# Patient Record
Sex: MALE | URBAN - METROPOLITAN AREA
[De-identification: names, ages, dates, MRNs, and addresses within clinical notes are randomized per-mention and may not be internally consistent; named-entity substitution may affect disease eponyms.]

---

## 2017-02-08 ENCOUNTER — TELEPHONE (OUTPATIENT)
Dept: FAMILY MEDICINE CLINIC | Facility: CLINIC | Age: 37
End: 2017-02-08

## 2017-02-08 DIAGNOSIS — M79.673 PAIN OF FOOT, UNSPECIFIED LATERALITY: Primary | ICD-10-CM

## 2017-02-08 NOTE — TELEPHONE ENCOUNTER
Pt called in requesting a referral to see Dr. Mindy Dumont for a follow up visit on his foot pain. Pt states he has a scheduled appt on 2/24.   Pt is asking the referral can be placed to him and if he can get a call back once referral is placed in the mail, zain

## 2017-02-24 ENCOUNTER — TELEPHONE (OUTPATIENT)
Dept: ADMINISTRATIVE | Age: 37
End: 2017-02-24

## 2017-02-24 ENCOUNTER — OFFICE VISIT (OUTPATIENT)
Dept: PODIATRY CLINIC | Facility: CLINIC | Age: 37
End: 2017-02-24

## 2017-02-24 DIAGNOSIS — M20.11 HALLUX VALGUS, RIGHT: Primary | ICD-10-CM

## 2017-02-24 PROCEDURE — 99213 OFFICE O/P EST LOW 20 MIN: CPT | Performed by: PODIATRIST

## 2017-02-24 PROCEDURE — 99212 OFFICE O/P EST SF 10 MIN: CPT | Performed by: PODIATRIST

## 2017-02-24 NOTE — TELEPHONE ENCOUNTER
That was not a discussion we had. He has a painful bunion, if he has that much pain we should be doing surgery.  scr

## 2017-02-24 NOTE — TELEPHONE ENCOUNTER
Good morning Dr. Phyllis Niño form pending in [facility]. Patient is requesting intermittent time off, 1-3 days per month, do you approve? Please advise.   Thanks  Reddy Delarosa

## 2017-02-24 NOTE — PROGRESS NOTES
HPI:    Patient ID: Sussy Shepherd is a 60 Wolf Stre[address] old male. HPI  29-year-old male presents with pain associated with the first metatarsophalangeal joint of the right foot.   The orthotic device is sitting correctly providing proper and adequate control right  (primary encounter diagnosis)    No orders of the defined types were placed in this encounter.        Meds This Visit:  No prescriptions requested or ordered in this encounter    Imaging & Referrals:  None       PI#[id number]

## 2017-03-09 ENCOUNTER — TELEPHONE (OUTPATIENT)
Dept: FAMILY MEDICINE CLINIC | Facility: CLINIC | Age: 37
End: 2017-03-09

## 2017-03-09 DIAGNOSIS — M25.519 SHOULDER PAIN, UNSPECIFIED CHRONICITY, UNSPECIFIED LATERALITY: Primary | ICD-10-CM

## 2017-11-09 ENCOUNTER — OFFICE VISIT (OUTPATIENT)
Dept: FAMILY MEDICINE CLINIC | Facility: CLINIC | Age: 37
End: 2017-11-09

## 2017-11-09 VITALS
DIASTOLIC BLOOD PRESSURE: 79 MMHG | BODY MASS INDEX: 26.83 KG/M2 | HEART RATE: 82 BPM | WEIGHT: 177 LBS | SYSTOLIC BLOOD PRESSURE: 141 MMHG | RESPIRATION RATE: 20 BRPM | HEIGHT: 68 IN

## 2017-11-09 DIAGNOSIS — Z00.00 ROUTINE PHYSICAL EXAMINATION: ICD-10-CM

## 2017-11-09 DIAGNOSIS — M54.9 DORSALGIA: ICD-10-CM

## 2017-11-09 PROCEDURE — 99395 PREV VISIT EST AGE 18-39: CPT | Performed by: FAMILY MEDICINE

## 2017-11-09 NOTE — PROGRESS NOTES
HPI:    Patient ID: Agustin Rodríguez is a 31year old male. Patient is here for routine physical exam. No acute issues. No significant chronic medical problems. Patient is requesting blood testing. Diet and exercise have been fair.  Past medical history alert. He has normal reflexes. Skin: No rash noted. Psychiatric: He has a normal mood and affect. His behavior is normal. Judgment and thought content normal.   Vitals reviewed.              ASSESSMENT/PLAN:   Routine physical examination:  - Exam is un

## 2017-11-11 ENCOUNTER — APPOINTMENT (OUTPATIENT)
Dept: LAB | Age: 37
End: 2017-11-11
Attending: FAMILY MEDICINE
Payer: COMMERCIAL

## 2017-11-11 DIAGNOSIS — Z00.00 ROUTINE PHYSICAL EXAMINATION: ICD-10-CM

## 2017-11-11 PROCEDURE — 85027 COMPLETE CBC AUTOMATED: CPT

## 2017-11-11 PROCEDURE — 80053 COMPREHEN METABOLIC PANEL: CPT

## 2017-11-11 PROCEDURE — 80061 LIPID PANEL: CPT

## 2017-11-11 PROCEDURE — 36415 COLL VENOUS BLD VENIPUNCTURE: CPT

## 2019-05-16 ENCOUNTER — OFFICE VISIT (OUTPATIENT)
Dept: FAMILY MEDICINE CLINIC | Facility: CLINIC | Age: 39
End: 2019-05-16
Payer: COMMERCIAL

## 2019-05-16 VITALS
SYSTOLIC BLOOD PRESSURE: 117 MMHG | BODY MASS INDEX: 27.14 KG/M2 | HEART RATE: 88 BPM | TEMPERATURE: 98 F | WEIGHT: 177 LBS | HEIGHT: 67.6 IN | DIASTOLIC BLOOD PRESSURE: 77 MMHG | RESPIRATION RATE: 20 BRPM

## 2019-05-16 DIAGNOSIS — Z83.71 FAMILY HISTORY OF FAMILIAL POLYPOSIS: ICD-10-CM

## 2019-05-16 DIAGNOSIS — Z00.00 ROUTINE PHYSICAL EXAMINATION: Primary | ICD-10-CM

## 2019-05-16 PROCEDURE — 99395 PREV VISIT EST AGE 18-39: CPT | Performed by: FAMILY MEDICINE

## 2019-05-16 NOTE — PROGRESS NOTES
HPI:    Patient ID: Lemont Siemens is a 41year old male. Patient is here for routine physical exam. No acute issues. No significant chronic medical problems. Patient is requesting blood testing. Diet and exercise have been good.  Past medical history Soft. Bowel sounds are normal. He exhibits no distension. There is no tenderness. There is no rebound and no guarding. Genitourinary: Rectum normal, prostate normal and penis normal.   Musculoskeletal: Normal range of motion.    Lymphadenopathy:     He ha

## 2019-05-23 ENCOUNTER — LAB ENCOUNTER (OUTPATIENT)
Dept: LAB | Age: 39
End: 2019-05-23
Attending: FAMILY MEDICINE
Payer: COMMERCIAL

## 2019-05-23 ENCOUNTER — OFFICE VISIT (OUTPATIENT)
Dept: SURGERY | Facility: CLINIC | Age: 39
End: 2019-05-23
Payer: COMMERCIAL

## 2019-05-23 VITALS
BODY MASS INDEX: 27 KG/M2 | WEIGHT: 178 LBS | DIASTOLIC BLOOD PRESSURE: 80 MMHG | HEART RATE: 90 BPM | TEMPERATURE: 98 F | SYSTOLIC BLOOD PRESSURE: 141 MMHG

## 2019-05-23 DIAGNOSIS — Z00.00 ROUTINE PHYSICAL EXAMINATION: ICD-10-CM

## 2019-05-23 DIAGNOSIS — R10.9 BILATERAL FLANK PAIN: Primary | ICD-10-CM

## 2019-05-23 PROCEDURE — 99212 OFFICE O/P EST SF 10 MIN: CPT | Performed by: NURSE PRACTITIONER

## 2019-05-23 PROCEDURE — 80061 LIPID PANEL: CPT

## 2019-05-23 PROCEDURE — 36415 COLL VENOUS BLD VENIPUNCTURE: CPT

## 2019-05-23 PROCEDURE — 80053 COMPREHEN METABOLIC PANEL: CPT

## 2019-05-23 PROCEDURE — 99243 OFF/OP CNSLTJ NEW/EST LOW 30: CPT | Performed by: NURSE PRACTITIONER

## 2019-05-23 PROCEDURE — 85027 COMPLETE CBC AUTOMATED: CPT

## 2019-05-23 NOTE — PROGRESS NOTES
HPI:    Patient ID: Sudhakar Goyal is a 41year old male.     HPI   Patient is a 41year old male who presents to the clinic for a consult at the request of, and a copy of this note will be sent to, Dr. Aye Bcak, regarding genital lesions and flank poli tablet Rfl: 5   Meloxicam 15 MG Oral Tab Take one tablet by mouth daily with dinner.  Disp: 30 tablet Rfl: 1     Allergies:No Known Allergies    HISTORY:  Past Medical History:   Diagnosis Date   • Acid reflux       Past Surgical History:   Procedure Jazmin Deem no abnormality then no further work up is indicated. Follow up as needed. No orders of the defined types were placed in this encounter.       Meds This Visit:  Requested Prescriptions      No prescriptions requested or ordered in this encounter

## 2019-08-03 ENCOUNTER — TELEPHONE (OUTPATIENT)
Dept: FAMILY MEDICINE CLINIC | Facility: CLINIC | Age: 39
End: 2019-08-03

## 2019-08-03 DIAGNOSIS — M54.9 DORSALGIA: Primary | ICD-10-CM

## 2019-08-03 NOTE — TELEPHONE ENCOUNTER
Pt is requesting a referral for a Chiropractor  for his back states that Dr. Wilma Hair gave him a referral before did inform the pt that Dr. Wilma Hair might want him to schedule a appt    Please advise

## 2019-08-05 NOTE — TELEPHONE ENCOUNTER
Referral request noted.  Referral approved, signed and sent to [facility] for Dr Jose R León

## 2019-08-08 NOTE — TELEPHONE ENCOUNTER
Pt was following up on referral for Chiropractor, Dr. Wilson Carrel. The insurance was put on chart wrong initially and [facility] care was unable to submit it to insurance. [facility] re-added insurance properly and Pt is asking for a referral ASAP, because he would like to be seen soon. Please advise.

## 2019-08-10 NOTE — TELEPHONE ENCOUNTER
Pt was seen in May of this year for check up./ routine physical and has had hx of issues in past in 2017 but never went for treatment

## 2019-09-12 ENCOUNTER — PATIENT MESSAGE (OUTPATIENT)
Dept: FAMILY MEDICINE CLINIC | Facility: CLINIC | Age: 39
End: 2019-09-12

## 2019-09-12 NOTE — TELEPHONE ENCOUNTER
From: Linda Median  To: Dianne Peterson MD  Sent: 9/12/2019 12:30 PM CDT  Subject: Referral Request    May I have a new referral with Dr Lopez  as previous dine? I have a different insurance than now and need a approval.   Thank you.

## 2019-12-11 ENCOUNTER — TELEPHONE (OUTPATIENT)
Dept: SURGERY | Facility: CLINIC | Age: 39
End: 2019-12-11

## 2021-03-01 ENCOUNTER — LAB REQUISITION (OUTPATIENT)
Dept: LAB | Age: 41
End: 2021-03-01

## 2021-09-13 NOTE — TELEPHONE ENCOUNTER
Pt is requesting to have a referral to see Orthopedics Dr. Yaneth Beyer   RE----Shoulder issues for years   Please advise once this has been approved   Pt also want to make sure he is in the Network Pfizer dose 1 and 2

## 2022-09-18 NOTE — TELEPHONE ENCOUNTER
Care Management Initial Consult    General Information  Assessment completed with: Patient,    Type of CM/SW Visit: Initial Assessment    Primary Care Provider verified and updated as needed:     Readmission within the last 30 days:        Reason for Consult: discharge planning  Advance Care Planning:            Communication Assessment  Patient's communication style: spoken language (English or Bilingual)    Hearing Difficulty or Deaf: no   Wear Glasses or Blind: no    Cognitive  Cognitive/Neuro/Behavioral: WDL                      Living Environment:   People in home: facility resident     Current living Arrangements: independent living facility      Able to return to prior arrangements: no       Family/Social Support:  Care provided by: self  Provides care for: no one  Marital Status: Single  Children          Description of Support System: Involved    Support Assessment: Adequate family and caregiver support    Current Resources:   Patient receiving home care services: No     Community Resources: None  Equipment currently used at home: cane, straight (For community ambulation)  Supplies currently used at home:      Employment/Financial:  Employment Status: retired        Financial Concerns: No concerns identified   Referral to Financial Worker: No       Lifestyle & Psychosocial Needs:  Social Determinants of Health     Tobacco Use: Medium Risk     Smoking Tobacco Use: Former Smoker     Smokeless Tobacco Use: Never Used   Alcohol Use: Not on file   Financial Resource Strain: Not on file   Food Insecurity: Not on file   Transportation Needs: Not on file   Physical Activity: Not on file   Stress: Not on file   Social Connections: Not on file   Intimate Partner Violence: Not on file   Depression: Not at risk     PHQ-2 Score: 0   Housing Stability: Not on file       Functional Status:  Prior to admission patient needed assistance:              Mental Health Status:  Mental Health Status: No Current Concerns    Email response sent to patient.     Chemical Dependency Status:  Chemical Dependency Status: No Current Concerns             Values/Beliefs:  Spiritual, Cultural Beliefs, Gnosticist Practices, Values that affect care: no               Additional Information:  SW reviewed chart. Met with pt. He lives in independent living at [facility]. PT recommends TCU. Pt is in agreement. Discussed [facility], [facility] and [facility], in that order. Referrals sent. He may ask his daughter to transport. Also informed him transportation could be arranged for a private fee. He is currently in OBS status and has Medicare coverage. Will need to confirm with TCU that Medicare is still waiving the 3-day stay requirement for TCU coverage. SW following.     MAYA Johnson, [facility]  [phone] Desk phone  [phone] Cell/text (Preferred)  [facility]

## (undated) NOTE — MR AVS SNAPSHOT
[address]  [address]  [phone]               Thank you for choosing us for your health care visit with Farzana Fuentes DPM.  We are glad to serve you and happy to provide yo Expires: 4/25/2017 11:33 AM    If you have questions, you can call [phone] to talk to our [facility] Staff. Remember, [employer] is NOT to be used for urgent needs. For medical emergencies, dial 911.         Educational Information     [employer]